# Patient Record
Sex: FEMALE | Race: ASIAN | NOT HISPANIC OR LATINO | URBAN - METROPOLITAN AREA
[De-identification: names, ages, dates, MRNs, and addresses within clinical notes are randomized per-mention and may not be internally consistent; named-entity substitution may affect disease eponyms.]

---

## 2021-01-26 ENCOUNTER — EMERGENCY (EMERGENCY)
Facility: HOSPITAL | Age: 56
LOS: 1 days | Discharge: HOME | End: 2021-01-26
Attending: EMERGENCY MEDICINE | Admitting: EMERGENCY MEDICINE
Payer: COMMERCIAL

## 2021-01-26 VITALS
DIASTOLIC BLOOD PRESSURE: 94 MMHG | SYSTOLIC BLOOD PRESSURE: 151 MMHG | OXYGEN SATURATION: 100 % | TEMPERATURE: 98 F | RESPIRATION RATE: 18 BRPM | HEART RATE: 86 BPM

## 2021-01-26 PROCEDURE — 99283 EMERGENCY DEPT VISIT LOW MDM: CPT

## 2021-01-26 RX ORDER — DIPHENHYDRAMINE HCL 50 MG
50 CAPSULE ORAL ONCE
Refills: 0 | Status: COMPLETED | OUTPATIENT
Start: 2021-01-26 | End: 2021-01-26

## 2021-01-26 RX ORDER — DEXAMETHASONE 0.5 MG/5ML
10 ELIXIR ORAL ONCE
Refills: 0 | Status: DISCONTINUED | OUTPATIENT
Start: 2021-01-26 | End: 2021-01-26

## 2021-01-26 RX ADMIN — Medication 40 MILLIGRAM(S): at 05:38

## 2021-01-26 RX ADMIN — Medication 50 MILLIGRAM(S): at 05:38

## 2021-01-26 NOTE — ED PROVIDER NOTE - PHYSICAL EXAMINATION
GENERAL: Awake, alert, NAD  HEENT: NC/AT, moist mucous membranes, left eye with mild periobital edema   LUNGS: CTAB, no wheezes or crackles   CARDIAC: RRR, no m/r/g  ABDOMEN: Soft, normal BS, non tender, non distended, no rebound, no guarding  BACK: No midline spinal tenderness, no CVA tenderness  EXT: No edema, no calf tenderness, 2+ DP pulses bilaterally, no deformities.  NEURO: A&Ox3. Moving all extremities.  SKIN: Warm and dry. Diffuse urticarial rash noted more significantly on abdomen and trunk  PSYCH: Normal affect.

## 2021-01-26 NOTE — ED PROVIDER NOTE - NSFOLLOWUPINSTRUCTIONS_ED_ALL_ED_FT
Please use prednisone as prescribed. You can take 25-50mg (1-2 tablets) of benadryl every 6-8 hours for itching. It will make you feel tired. Follow up with your primary care doctor.    SEEK IMMEDIATE MEDICAL CARE IF YOU HAVE ANY OF THE FOLLOWING SYMPTOMS: fever, blisters, a rash inside your mouth, vaginal or anal pain, or altered mental status.

## 2021-01-26 NOTE — ED ADULT TRIAGE NOTE - CHIEF COMPLAINT QUOTE
pt c/o allergic reaction to pizza she ate on Friday. Pt states started with itching to chest and face, but now has swelling to L eye. Pt denies itching, swelling, tingling to throat/mouth. Denies difficulty breathing/swallowing.

## 2021-01-26 NOTE — ED PROVIDER NOTE - CARE PLAN
Subjective:  
62 Principal Discharge DX:	Urticaria

## 2021-01-26 NOTE — ED PROVIDER NOTE - ATTENDING CONTRIBUTION TO CARE
56 y/o F with no significant PMH here with hives.  Pt reports 3 days ago she had pizza and afterwards developed a pruritic rash to her body.  She deneis any known allergies.  Other than the pizza which had a lot of toppings, no other new foods or allergens (soaps, lotions, pets, etc).  No fever, cough, sob, swelling, n/v/d, abd pain, cp.  Pt took claritin at home without relief.  Well appearing, sitting comfortably in stretcher, awake and alert, nontoxic.  AF/VSS.  Mild periorbital swelling but no other facial or oral swelling, no drooling or resp distress.  Lungs cta bl.  Cards nl S1/S2, RRR, no MRG.  Abd soft ntnd.  Diffuse urticaria to upper extremities.  WIll treat for allergic reaction, no e/o anaphylaxis, antihistamines and steroids, outpatient allergy f/u.

## 2021-01-26 NOTE — ED PROVIDER NOTE - PATIENT PORTAL LINK FT
You can access the FollowMyHealth Patient Portal offered by [facility] by registering at the following website: [email or web address]. By joining [employer]’s FollowMyHealth portal, you will also be able to view your health information using other applications (apps) compatible with our system.

## 2021-01-26 NOTE — ED PROVIDER NOTE - OBJECTIVE STATEMENT
54 y/o female with no significant PMH presents to the ED with allergic reaction. States she ate some new pizza three nights ago and afterwards developed a diffuse itching rash. Now reports her left eye is swollen although not itchy. She tried using claritin without relief of symptoms. Denies nausea, vomiting, fever, chills, vision changes, chest pain, shortness of breath.

## 2021-01-26 NOTE — ED PROVIDER NOTE - CLINICAL SUMMARY MEDICAL DECISION MAKING FREE TEXT BOX
54 y/o female with no significant PMH presents to the ED c/o rash to diffuse body. No airway involvement. Plan for benadryl and prednisone and follow up with PMD. Lio Alan DO PGY2

## 2021-01-26 NOTE — ED PROVIDER NOTE - NS ED ROS FT
CONST: no fevers, no chills  EYES: no pain, no vision changes  ENT: no sore throat, no ear pain, no change in hearing  CV: no chest pain, no leg swelling  RESP: no shortness of breath, no cough  ABD: no abdominal pain, no nausea, no vomiting, no diarrhea  : no dysuria, no flank pain, no hematuria  MSK: no back pain, no extremity pain  NEURO: no headache or additional neurologic complaints  HEME: no easy bleeding  SKIN:  + rash

## 2024-04-02 ENCOUNTER — EMERGENCY (EMERGENCY)
Facility: HOSPITAL | Age: 59
LOS: 1 days | Discharge: HOME | End: 2024-04-02
Attending: EMERGENCY MEDICINE | Admitting: EMERGENCY MEDICINE
Payer: COMMERCIAL

## 2024-04-02 VITALS
OXYGEN SATURATION: 99 % | TEMPERATURE: 97 F | DIASTOLIC BLOOD PRESSURE: 89 MMHG | HEART RATE: 57 BPM | SYSTOLIC BLOOD PRESSURE: 160 MMHG | RESPIRATION RATE: 16 BRPM

## 2024-04-02 VITALS
TEMPERATURE: 98 F | OXYGEN SATURATION: 99 % | SYSTOLIC BLOOD PRESSURE: 158 MMHG | RESPIRATION RATE: 18 BRPM | DIASTOLIC BLOOD PRESSURE: 74 MMHG | HEART RATE: 59 BPM

## 2024-04-02 PROCEDURE — 73030 X-RAY EXAM OF SHOULDER: CPT | Mod: 26,RT

## 2024-04-02 PROCEDURE — 71045 X-RAY EXAM CHEST 1 VIEW: CPT | Mod: 26

## 2024-04-02 PROCEDURE — 99284 EMERGENCY DEPT VISIT MOD MDM: CPT

## 2024-04-02 PROCEDURE — 93010 ELECTROCARDIOGRAM REPORT: CPT

## 2024-04-02 RX ORDER — KETOROLAC TROMETHAMINE 30 MG/ML
15 SYRINGE (ML) INJECTION ONCE
Refills: 0 | Status: DISCONTINUED | OUTPATIENT
Start: 2024-04-02 | End: 2024-04-02

## 2024-04-02 RX ORDER — LIDOCAINE 4 G/100G
1 CREAM TOPICAL
Qty: 1 | Refills: 0
Start: 2024-04-02 | End: 2024-04-04

## 2024-04-02 RX ORDER — ACETAMINOPHEN 500 MG
650 TABLET ORAL ONCE
Refills: 0 | Status: COMPLETED | OUTPATIENT
Start: 2024-04-02 | End: 2024-04-02

## 2024-04-02 RX ORDER — ONDANSETRON 8 MG/1
4 TABLET, FILM COATED ORAL ONCE
Refills: 0 | Status: COMPLETED | OUTPATIENT
Start: 2024-04-02 | End: 2024-04-02

## 2024-04-02 RX ORDER — LIDOCAINE 4 G/100G
1 CREAM TOPICAL ONCE
Refills: 0 | Status: COMPLETED | OUTPATIENT
Start: 2024-04-02 | End: 2024-04-02

## 2024-04-02 RX ORDER — CYCLOBENZAPRINE HYDROCHLORIDE 10 MG/1
1 TABLET, FILM COATED ORAL
Qty: 9 | Refills: 0
Start: 2024-04-02 | End: 2024-04-04

## 2024-04-02 RX ORDER — CYCLOBENZAPRINE HYDROCHLORIDE 10 MG/1
10 TABLET, FILM COATED ORAL ONCE
Refills: 0 | Status: COMPLETED | OUTPATIENT
Start: 2024-04-02 | End: 2024-04-02

## 2024-04-02 RX ADMIN — Medication 650 MILLIGRAM(S): at 08:59

## 2024-04-02 RX ADMIN — LIDOCAINE 1 PATCH: 4 CREAM TOPICAL at 10:34

## 2024-04-02 RX ADMIN — CYCLOBENZAPRINE HYDROCHLORIDE 10 MILLIGRAM(S): 10 TABLET, FILM COATED ORAL at 08:59

## 2024-04-02 RX ADMIN — ONDANSETRON 4 MILLIGRAM(S): 8 TABLET, FILM COATED ORAL at 08:59

## 2024-04-02 RX ADMIN — Medication 15 MILLIGRAM(S): at 08:59

## 2024-04-02 NOTE — ED ADULT NURSE NOTE - OBJECTIVE STATEMENT
This is a 58 y/0 female complaining of sudden onset of pain on the right side of her upper back down to her waistline, 8/10. Alert and oriented x 4. Denies past medical history. Vomited bile colored emesis, Looks unwell. Due meds given able to tolerate oral meds with sips of water. Vital sign stable, family at bedside.

## 2024-04-02 NOTE — ED ADULT NURSE NOTE - NSFALLUNIVINTERV_ED_ALL_ED
Bed/Stretcher in lowest position, wheels locked, appropriate side rails in place/Call bell, personal items and telephone in reach/Instruct patient to call for assistance before getting out of bed/chair/stretcher/Non-slip footwear applied when patient is off stretcher/[place] to call system/Physically safe environment - no spills, clutter or unnecessary equipment/Purposeful proactive rounding/Room/bathroom lighting operational, light cord in reach

## 2024-04-02 NOTE — ED PROVIDER NOTE - CLINICAL SUMMARY MEDICAL DECISION MAKING FREE TEXT BOX
58-year-old female with pain to right upper back rating to right shoulder since waking up this morning.  No trauma, injury, or recent heavy lifting.  Pain worse with movements and certain positions.  no chest pain or shortness of breath.  Similar symptoms last month.  Vital signs stable.  On physical exam patient appears uncomfortable, heart regular rate and rhythm, lungs clear to auscultation, abdomen soft and not tender, reproducible tenderness to palpation to right upper back.  No tenderness over shoulder/humerus.  No midline spinal tenderness.  Concern for musculoskeletal strain.  Will give pain medications, muscle relaxants, re-eval.  Will also image shoulder/chest for bony/joint abnormalities.

## 2024-04-02 NOTE — ED PROVIDER NOTE - OBJECTIVE STATEMENT
58-year-old female, no chronic medical conditions, presents to ED with complaints of pain to right upper back radiates to right shoulder since waking up this morning.  Pain worse with moving and positions.  No chest pain or shortness of breath.  No trauma or injuries.  No heavy lifting.   patient reports light exercise on a regular basis.  Reports similar symptoms a month ago, was seen at urgent care, discharged with pain medications and pain resolved.  Current symptoms feel similar.  Has not taken any medications at home.  Had episode of emesis and waiting room.

## 2024-04-02 NOTE — ED PROVIDER NOTE - PATIENT PORTAL LINK FT
You can access the FollowMyHealth Patient Portal offered by [facility] by registering at the following website: [email or web address]. By joining [employer]’s FollowMyHealth portal, you will also be able to view your health information using other applications (apps) compatible with our system.

## 2024-04-02 NOTE — ED PROVIDER NOTE - PHYSICAL EXAMINATION
CONSTITUTIONAL: Uncomfortable appearing, awake, alert, and in no apparent distress.  HEAD: normocephalic, atraumatic  ENT: oral mucosa moist  CARDIAC: regular rate and rhythm; no murmurs, rubs, or gallops  RESPIRATORY: normal breath sounds, clear to ascultation bilaterally, no rales, rhonchi, wheezing  GASTROINTESTINAL: abdomen nondistended, soft, nontender, no guarding, rebound tenderness  MSK: Spine appears normal, range of motion is not limited, +reproducible tenderness to palpation to right upper back; no tenderness over shoulder or humerus; no midline C/T/L/S-spine tenderness  NEURO: Alert and oriented, no focal deficits, no motor or sensory deficits.  SKIN: Skin normal color for race, warm, dry and intact. No evidence of rash.  PSYCH: appropriate mood and affect Is This A New Presentation, Or A Follow-Up?: Skin Lesions How Severe Is Your Skin Lesion?: moderate Have Your Skin Lesions Been Treated?: not been treated

## 2024-04-02 NOTE — ED PROVIDER NOTE - ATTENDING CONTRIBUTION TO CARE
DR. LEIGH, ATTENDING MD-  I performed a face to face bedside interview with the patient regarding history of present illness, review of symptoms and past medical history. I completed an independent physical exam.  I have discussed the patient's plan of care with the resident.   Documentation as above in the note.    58-year-old female with complaint of right upper back and right shoulder pain since yesterday.  Patient works as a .  Denies numbness tingling or weakness.  On exam there is no swelling or deformity or overlying skin changes.  Mild tenderness to palpation at the right parascapular region.  Full range of motion right shoulder, distally neurovascularly intact.  Likely muscle strain versus calcific tendinitis.  Obtain chest x-ray shoulder x-ray give pain medication, likely discharge with PCP and Ortho follow-up as outpatient.

## 2024-04-02 NOTE — ED PROVIDER NOTE - NSFOLLOWUPINSTRUCTIONS_ED_ALL_ED_FT
You were seen in the Emergency Department for back pain.   Your tests and evaluation show your symptoms are not due to any dangerous or life threatening causes.  We have sent you a prescription for a muscle relaxant.  Please do not drive or operate heavy machinery when you take it.  You can also take over the counter acetaminophen and/or ibuprofen as needed for pain and/or apply ice/heat for 10 minutes on and 10 minutes off.      1) Continue all previously prescribed medications as directed.    2) Follow up with your primary care physician - take copies of your results.    3) Return to the Emergency Department for worsening or persistent symptoms, and/or ANY NEW OR CONCERNING SYMPTOMS.

## 2024-04-02 NOTE — ED PROVIDER NOTE - PROGRESS NOTE DETAILS
Kaleb PGY1: re-evaluated patient who notes improvement in pain from 10/10 to 4/10.  Updated on negative imaging results.  Discussed plan for discharge rx for muscle relaxants short term.  Also advised acetaminophen/ibuprofen, ice/heat as needed for pain.  Patient understands and agrees. Kaleb PGY1: re-evaluated patient who notes improvement in pain from 10/10 to 4/10.  Updated on imaging results negative for acute findings, some arthrosis right AC joint.  Discussed plan for discharge rx for muscle relaxants short term.  Also advised acetaminophen/ibuprofen, ice/heat as needed for pain.  Given ortho referral list.  Patient understands and agrees.
